# Patient Record
Sex: MALE | Race: WHITE | Employment: STUDENT | URBAN - NONMETROPOLITAN AREA
[De-identification: names, ages, dates, MRNs, and addresses within clinical notes are randomized per-mention and may not be internally consistent; named-entity substitution may affect disease eponyms.]

---

## 2017-05-24 ENCOUNTER — OFFICE VISIT (OUTPATIENT)
Dept: FAMILY MEDICINE CLINIC | Age: 22
End: 2017-05-24

## 2017-05-24 VITALS
BODY MASS INDEX: 29.61 KG/M2 | TEMPERATURE: 97.7 F | HEIGHT: 73 IN | SYSTOLIC BLOOD PRESSURE: 122 MMHG | RESPIRATION RATE: 12 BRPM | WEIGHT: 223.4 LBS | HEART RATE: 62 BPM | DIASTOLIC BLOOD PRESSURE: 62 MMHG

## 2017-05-24 DIAGNOSIS — F41.9 ANXIETY: Chronic | ICD-10-CM

## 2017-05-24 DIAGNOSIS — F90.2 ADHD (ATTENTION DEFICIT HYPERACTIVITY DISORDER), COMBINED TYPE: Primary | Chronic | ICD-10-CM

## 2017-05-24 DIAGNOSIS — F33.2 SEVERE EPISODE OF RECURRENT MAJOR DEPRESSIVE DISORDER, WITHOUT PSYCHOTIC FEATURES (HCC): Chronic | ICD-10-CM

## 2017-05-24 PROCEDURE — 99213 OFFICE O/P EST LOW 20 MIN: CPT | Performed by: FAMILY MEDICINE

## 2017-05-24 RX ORDER — ESCITALOPRAM OXALATE 10 MG/1
10 TABLET ORAL DAILY
Qty: 30 TABLET | Refills: 3 | Status: SHIPPED | OUTPATIENT
Start: 2017-05-24 | End: 2017-10-26 | Stop reason: SDUPTHER

## 2017-05-31 ENCOUNTER — OFFICE VISIT (OUTPATIENT)
Dept: PSYCHOLOGY | Age: 22
End: 2017-05-31

## 2017-05-31 DIAGNOSIS — F33.2 MAJOR DEPRESSIVE DISORDER, RECURRENT EPISODE, SEVERE WITH ANXIOUS DISTRESS (HCC): Primary | ICD-10-CM

## 2017-05-31 PROCEDURE — 90791 PSYCH DIAGNOSTIC EVALUATION: CPT | Performed by: PSYCHOLOGIST

## 2017-05-31 ASSESSMENT — PATIENT HEALTH QUESTIONNAIRE - PHQ9
1. LITTLE INTEREST OR PLEASURE IN DOING THINGS: 2
2. FEELING DOWN, DEPRESSED OR HOPELESS: 1
8. MOVING OR SPEAKING SO SLOWLY THAT OTHER PEOPLE COULD HAVE NOTICED. OR THE OPPOSITE, BEING SO FIGETY OR RESTLESS THAT YOU HAVE BEEN MOVING AROUND A LOT MORE THAN USUAL: 1
7. TROUBLE CONCENTRATING ON THINGS, SUCH AS READING THE NEWSPAPER OR WATCHING TELEVISION: 0
9. THOUGHTS THAT YOU WOULD BE BETTER OFF DEAD, OR OF HURTING YOURSELF: 3
5. POOR APPETITE OR OVEREATING: 1
SUM OF ALL RESPONSES TO PHQ9 QUESTIONS 1 & 2: 3
3. TROUBLE FALLING OR STAYING ASLEEP: 3
6. FEELING BAD ABOUT YOURSELF - OR THAT YOU ARE A FAILURE OR HAVE LET YOURSELF OR YOUR FAMILY DOWN: 3
SUM OF ALL RESPONSES TO PHQ QUESTIONS 1-9: 15
4. FEELING TIRED OR HAVING LITTLE ENERGY: 1
10. IF YOU CHECKED OFF ANY PROBLEMS, HOW DIFFICULT HAVE THESE PROBLEMS MADE IT FOR YOU TO DO YOUR WORK, TAKE CARE OF THINGS AT HOME, OR GET ALONG WITH OTHER PEOPLE: 2

## 2017-06-07 ENCOUNTER — OFFICE VISIT (OUTPATIENT)
Dept: FAMILY MEDICINE CLINIC | Age: 22
End: 2017-06-07

## 2017-06-07 VITALS
SYSTOLIC BLOOD PRESSURE: 132 MMHG | WEIGHT: 225 LBS | HEIGHT: 74 IN | BODY MASS INDEX: 28.88 KG/M2 | DIASTOLIC BLOOD PRESSURE: 74 MMHG | RESPIRATION RATE: 10 BRPM | HEART RATE: 96 BPM | TEMPERATURE: 97.9 F

## 2017-06-07 DIAGNOSIS — F33.2 SEVERE EPISODE OF RECURRENT MAJOR DEPRESSIVE DISORDER, WITHOUT PSYCHOTIC FEATURES (HCC): Primary | Chronic | ICD-10-CM

## 2017-06-07 DIAGNOSIS — F41.9 ANXIETY: Chronic | ICD-10-CM

## 2017-06-07 PROCEDURE — 99213 OFFICE O/P EST LOW 20 MIN: CPT | Performed by: FAMILY MEDICINE

## 2017-06-14 ENCOUNTER — OFFICE VISIT (OUTPATIENT)
Dept: PSYCHOLOGY | Age: 22
End: 2017-06-14

## 2017-06-14 DIAGNOSIS — F33.2 MAJOR DEPRESSIVE DISORDER, RECURRENT EPISODE, SEVERE WITH ANXIOUS DISTRESS (HCC): Primary | ICD-10-CM

## 2017-06-14 PROCEDURE — 90832 PSYTX W PT 30 MINUTES: CPT | Performed by: PSYCHOLOGIST

## 2017-06-27 ENCOUNTER — TELEPHONE (OUTPATIENT)
Dept: FAMILY MEDICINE CLINIC | Age: 22
End: 2017-06-27

## 2017-10-26 ENCOUNTER — OFFICE VISIT (OUTPATIENT)
Dept: FAMILY MEDICINE CLINIC | Age: 22
End: 2017-10-26
Payer: COMMERCIAL

## 2017-10-26 VITALS
TEMPERATURE: 98.1 F | RESPIRATION RATE: 16 BRPM | BODY MASS INDEX: 32.19 KG/M2 | HEIGHT: 74 IN | SYSTOLIC BLOOD PRESSURE: 134 MMHG | HEART RATE: 96 BPM | DIASTOLIC BLOOD PRESSURE: 86 MMHG | WEIGHT: 250.8 LBS

## 2017-10-26 DIAGNOSIS — F90.2 ADHD (ATTENTION DEFICIT HYPERACTIVITY DISORDER), COMBINED TYPE: Chronic | ICD-10-CM

## 2017-10-26 DIAGNOSIS — F33.41 RECURRENT MAJOR DEPRESSIVE DISORDER, IN PARTIAL REMISSION (HCC): ICD-10-CM

## 2017-10-26 DIAGNOSIS — F41.9 ANXIETY: Chronic | ICD-10-CM

## 2017-10-26 PROCEDURE — 99213 OFFICE O/P EST LOW 20 MIN: CPT | Performed by: FAMILY MEDICINE

## 2017-10-26 RX ORDER — ESCITALOPRAM OXALATE 10 MG/1
10 TABLET ORAL DAILY
Qty: 30 TABLET | Refills: 3 | Status: SHIPPED | OUTPATIENT
Start: 2017-10-26 | End: 2017-12-13 | Stop reason: SDUPTHER

## 2017-10-26 NOTE — PATIENT INSTRUCTIONS
Take part in a Jainism activity or other social gathering. Go to a [employer] game. · Ask a friend to have dinner with you. Take care of yourself  · Eat a balanced diet with plenty of fresh fruits and vegetables, whole grains, and lean protein. If you have lost your appetite, eat small snacks rather than large meals. · Avoid drinking alcohol or using illegal drugs. Do not take medicines that have not been prescribed for you. They may interfere with medicines you may be taking for depression, or they may make your depression worse. · Take your medicines exactly as they are prescribed. You may start to feel better within 1 to 3 weeks of taking antidepressant medicine. But it can take as many as 6 to 8 weeks to see more improvement. If you have questions or concerns about your medicines, or if you do not notice any improvement by 3 weeks, talk to your doctor. · If you have any side effects from your medicine, tell your doctor. Antidepressants can make you feel tired, dizzy, or nervous. Some people have dry mouth, constipation, headaches, sexual problems, or diarrhea. Many of these side effects are mild and will go away on their own after you have been taking the medicine for a few weeks. Some may last longer. Talk to your doctor if side effects are bothering you too much. You might be able to try a different medicine. · Get enough sleep. If you have problems sleeping:  ¨ Go to bed at the same time every night, and get up at the same time every morning. ¨ Keep your bedroom dark and quiet. ¨ Do not exercise after 5:00 p.m. ¨ Avoid drinks with caffeine after 5:00 p.m. · Avoid sleeping pills unless they are prescribed by the doctor treating your depression. Sleeping pills may make you groggy during the day, and they may interact with other medicine you are taking. · If you have any other illnesses, such as diabetes, heart disease, or high blood pressure, make sure to continue with your treatment.  Tell your doctor about all of the medicines you take, including those with or without a prescription. · Keep the numbers for these national suicide hotlines: 1-078-871-TALK (6-759.631.3515) and 1-239-VKVKVBQ (9-286.327.5209). If you or someone you know talks about suicide or feeling hopeless, get help right away. When should you call for help? Call 911 anytime you think you may need emergency care. For example, call if:  · You feel like hurting yourself or someone else. · Someone you know has depression and is about to attempt or is attempting suicide. Call your doctor now or seek immediate medical care if:  · You hear voices. · Someone you know has depression and:  ¨ Starts to give away his or her possessions. ¨ Uses illegal drugs or drinks alcohol heavily. ¨ Talks or writes about death, including writing suicide notes or talking about guns, knives, or pills. ¨ Starts to spend a lot of time alone. ¨ Acts very aggressively or suddenly appears calm. Watch closely for changes in your health, and be sure to contact your doctor if:  · You do not get better as expected. Where can you learn more? Go to https://Ventrus Biosciences[employer]acao.L[employer]. org and sign in to your [employer] account. Enter W184 in the 5by box to learn more about \"Recovering From Depression: Care Instructions. \"     If you do not have an account, please click on the \"Sign Up Now\" link. Current as of: July 26, 2016  Content Version: 11.3  © 7330-3832 [employer]. Care instructions adapted under license by [facility] ([facility]). If you have questions about a medical condition or this instruction, always ask your healthcare professional. [place] [address] any warranty or liability for your use of this information. Patient Education        Anxiety Disorder: Care Instructions  Your Care Instructions  Anxiety is a normal reaction to stress.  Difficult situations can cause you to have symptoms such as sweaty palms and a nervous also may want to do other activities, such as running, swimming, cycling, or playing tennis or team sports. Relaxation techniques  Do relaxation exercises 10 to 20 minutes a day. You can play soothing, relaxing music while you do them, if you wish. · Tell others in your house that you are going to do your relaxation exercises. Ask them not to disturb you. · Find a comfortable place, away from all distractions and noise. · Lie down on your back, or sit with your back straight. · Focus on your breathing. Make it slow and steady. · Breathe in through your nose. Breathe out through either your nose or mouth. · Breathe deeply, filling up the area between your navel and your rib cage. Breathe so that your belly goes up and down. · Do not hold your breath. · Breathe like this for 5 to 10 minutes. Notice the feeling of calmness throughout your whole body. As you continue to breathe slowly and deeply, relax by doing the following for another 5 to 10 minutes:  · Tighten and relax each muscle group in your body. You can begin at your toes and work your way up to your head. · Imagine your muscle groups relaxing and becoming heavy. · Empty your mind of all thoughts. · Let yourself relax more and more deeply. · Become aware of the state of calmness that surrounds you. · When your relaxation time is over, you can bring yourself back to alertness by moving your fingers and toes and then your hands and feet and then stretching and moving your entire body. Sometimes people fall asleep during relaxation, but they usually wake up shortly afterward. · Always give yourself time to return to full alertness before you drive a car or do anything that might cause an accident if you are not fully alert. Never play a relaxation tape while you drive a car. When should you call for help? Call 911 anytime you think you may need emergency care.  For example, call if:  · You feel you cannot stop from hurting yourself or someone else.  Keep the numbers for these national suicide hotlines: 6-250-254-TALK (5-996.287.7133) and 7-800-VOUHEAG (2-114.587.6061). If you or someone you know talks about suicide or feeling hopeless, get help right away. Watch closely for changes in your health, and be sure to contact your doctor if:  · You have anxiety or fear that affects your life. · You have symptoms of anxiety that are new or different from those you had before. Where can you learn more? Go to https://FriendFinder [employer].discoapi. org and sign in to your [employer] account. Enter P754 in the Giggle box to learn more about \"Anxiety Disorder: Care Instructions. \"     If you do not have an account, please click on the \"Sign Up Now\" link. Current as of: July 26, 2016  Content Version: 11.3  © 6066-5455 [employer]. Care instructions adapted under license by [facility] ([facility]). If you have questions about a medical condition or this instruction, always ask your healthcare professional. [place] [address] any warranty or liability for your use of this information.

## 2017-10-26 NOTE — PROGRESS NOTES
Chief Complaint   Patient presents with    Follow-up     depression/anxiety/AHDH       History obtained from the patient. SUBJECTIVE:  Song Grayson is a 23 y.o. male that presents today for    1.) Depressed Mood:    HPI PRIOR VISIT: pt asked to see a therapist. After discussion, pt admits to long hx of on and off again depression. Had pretty severe depression in [place] [employer], was on prozac. Only took for a little while, didn't tolerate. Did do a lot of counseling and that helped quite a bit. Asking to see therapist. Having a lot anxiety as well. Hx of suicide attempt in the past. Currently has on and off again vague thoughts of suicide however currently denies SI/HI. Willing to start meds in addition to counseling    Depressed Mood? yes -   Anhedonia? yes -   Appetite changes? yes -   Sleep disturbances? yes -   Feelings of guilt? yes -   Decreased energy? yes -   Impaired concentration? yes -   Substance abuse? no    Suicidal/Homicidal Ideation? no        UPDATE LAST VISIT: started on lexapro last visit and has seen psychology. Modest improvement in 2 wks. Still depressed and anxious, but feeling better. No side effects from lexapro. Denies SI/HI currently. UPDATE TODAY: pt went home for the summer d/t worsening depression. Did not f/u with anyone, but did take his lexapro. Ran out of lexapro about 4 to 6 wks ago and has noted that his depression is starting to get worse and is getting more anxious. Denies SI/HI. Feeling much better than before, but does realize his sxs are coming back. School going well. Grades excellent so far. Has f/u with Dr. Otelia Boast tomorrow. 2.) ADHD    Current ADD regimen:  Vyvanse, typicaly works well. Out of meds for a month or so, so getting harder to focus  School is going well. Attendance is good. Behavior Problems:?: no  Sleep disturbances or appetite changes?: no and no  Evidence of abuse or diversion?: no    Nausea? no  Chest Pain? no  Headaches? no        Current Outpatient Prescriptions   Medication Sig Dispense Refill    escitalopram (LEXAPRO) 10 MG tablet Take 1 tablet by mouth daily 30 tablet 3    Lisdexamfetamine Dimesylate (VYVANSE) 60 MG CAPS Take 1 capsule by mouth daily . 30 capsule 0     No current facility-administered medications for this visit. Orders Placed This Encounter   Medications    escitalopram (LEXAPRO) 10 MG tablet     Sig: Take 1 tablet by mouth daily     Dispense:  30 tablet     Refill:  3    Lisdexamfetamine Dimesylate (VYVANSE) 60 MG CAPS     Sig: Take 1 capsule by mouth daily . Dispense:  30 capsule     Refill:  0         All medications reviewed and reconciled, including OTC and herbal medications. Updated list given to patient. Patient Active Problem List   Diagnosis    ADHD (attention deficit hyperactivity disorder), combined type    Major depression    Anxiety         Past Medical History:   Diagnosis Date    ADHD (attention deficit hyperactivity disorder), combined type     Anxiety     Major depression          History reviewed. No pertinent surgical history. No Known Allergies      Social History     Social History    Marital status: Single     Spouse name: N/A    Number of children: N/A    Years of education: N/A     Occupational History    Not on file. Social History Main Topics    Smoking status: Never Smoker    Smokeless tobacco: Never Used    Alcohol use No    Drug use: No    Sexual activity: Not on file     Other Topics Concern    Not on file     Social History Narrative    No narrative on file       Family History   Problem Relation Age of Onset    Asthma Father          I have reviewed the patient's past medical history, past surgical history, allergies, medications, social and family history and I have made updates where appropriate.       Review of Systems  Positive responses are highlighted in bold    Constitutional:  Fever, Chills, Night Sweats, Fatigue, Unexpected of the lower extremities. Skin: warm and dry, no rash or erythema  Psych: Affect constricted. Mood depressed. Thought process is normal without evidence of psychosis. Good insight and appropriate interaction. Cognition and memory appear to be intact. ASSESSMENT & PLAN  1. Recurrent major depressive disorder, in partial remission ([address]    Better than before, but starting to worsen again off his lexapro  Will resume this at 10mg daily  F/u with Dr. Luis Escobar for counseling  F/u here 8 wks    - escitalopram (LEXAPRO) 10 MG tablet; Take 1 tablet by mouth daily  Dispense: 30 tablet; Refill: 3    2. Anxiety    As above    - escitalopram (LEXAPRO) 10 MG tablet; Take 1 tablet by mouth daily  Dispense: 30 tablet; Refill: 3    3. ADHD (attention deficit hyperactivity disorder), combined type    Resume vyvance  May call for refills    OAARS reviewed and appropriate. Controlled Substances Monitoring:     Attestation: The Prescription Monitoring Report for this patient was reviewed today. HUI Hodges  Documentation: Possible medication side effects, risk of tolerance and/or dependence, and alternative treatments discussed., No signs of potential drug abuse or diversion identified. Radha Barnard DO)    - Lisdexamfetamine Dimesylate (VYVANSE) 60 MG CAPS; Take 1 capsule by mouth daily . Dispense: 30 capsule; Refill: 0      DISPOSITION    Return in about 8 weeks (around 12/21/2017) for follow-up depression and ADHD, sooner as needed. Aguero Dose released without restrictions. Future Appointments  Date Time Provider Sammie Fernandez   10/27/2017 8:00 AM Radha Barnard PSY.D [facility] [place]   12/21/2017 1:00 PM Radha Barnard DO [facility] [address]    Patient given educational materials on: See Attached    Barriers to learning and self management: none    Discussed use, benefit, and side effects of prescribed medications.   Barriers to medication compliance addressed. All patient questions answered. Pt voiced understanding.        Electronically signed by Lori Pedro DO on 10/26/2017 at 3:41 PM

## 2017-10-26 NOTE — PROGRESS NOTES
Visit Information    Have you changed or started any medications since your last visit including any over-the-counter medicines, vitamins, or herbal medicines? no   Are you having any side effects from any of your medications? -  no  Have you stopped taking any of your medications? Is so, why? -  no    Have you seen any other physician or provider since your last visit? No  Have you had any other diagnostic tests since your last visit? No  Have you been seen in the emergency room and/or had an admission to a hospital since we last saw you? No  Have you had your routine dental cleaning in the past 6 months? yes - 6/2017    Have you activated your [employer] account? If not, what are your barriers?  Yes     Patient Care Team:  Sergey Wood DO as PCP - General (Family Medicine)    Medical History Review  Past Medical, Family, and Social History reviewed and does contribute to the patient presenting condition    Health Maintenance   Topic Date Due    HIV screen  04/21/2012    Meningococcal (MCV) Vaccine Age 0-22 Years (1 of 1) 04/21/2013    Flu vaccine (1) 09/01/2017    DTaP/Tdap/Td vaccine (2 - Td) 01/01/2021

## 2017-10-27 ENCOUNTER — OFFICE VISIT (OUTPATIENT)
Dept: PSYCHOLOGY | Age: 22
End: 2017-10-27
Payer: COMMERCIAL

## 2017-10-27 DIAGNOSIS — F33.0 MAJOR DEPRESSIVE DISORDER, RECURRENT EPISODE, MILD (HCC): Primary | ICD-10-CM

## 2017-10-27 PROCEDURE — 90834 PSYTX W PT 45 MINUTES: CPT | Performed by: PSYCHOLOGIST

## 2017-10-27 ASSESSMENT — PATIENT HEALTH QUESTIONNAIRE - PHQ9
10. IF YOU CHECKED OFF ANY PROBLEMS, HOW DIFFICULT HAVE THESE PROBLEMS MADE IT FOR YOU TO DO YOUR WORK, TAKE CARE OF THINGS AT HOME, OR GET ALONG WITH OTHER PEOPLE: 1
3. TROUBLE FALLING OR STAYING ASLEEP: 1
2. FEELING DOWN, DEPRESSED OR HOPELESS: 1
8. MOVING OR SPEAKING SO SLOWLY THAT OTHER PEOPLE COULD HAVE NOTICED. OR THE OPPOSITE, BEING SO FIGETY OR RESTLESS THAT YOU HAVE BEEN MOVING AROUND A LOT MORE THAN USUAL: 0
5. POOR APPETITE OR OVEREATING: 1
9. THOUGHTS THAT YOU WOULD BE BETTER OFF DEAD, OR OF HURTING YOURSELF: 1
6. FEELING BAD ABOUT YOURSELF - OR THAT YOU ARE A FAILURE OR HAVE LET YOURSELF OR YOUR FAMILY DOWN: 2
1. LITTLE INTEREST OR PLEASURE IN DOING THINGS: 1
4. FEELING TIRED OR HAVING LITTLE ENERGY: 0
7. TROUBLE CONCENTRATING ON THINGS, SUCH AS READING THE NEWSPAPER OR WATCHING TELEVISION: 0
SUM OF ALL RESPONSES TO PHQ9 QUESTIONS 1 & 2: 2
SUM OF ALL RESPONSES TO PHQ QUESTIONS 1-9: 7

## 2017-10-27 NOTE — PATIENT INSTRUCTIONS
1.  Information about how deep breathing can help with performance is attached for you. 2.  Additional information is also attached about healthy behaviors to help with depression. 3.  Continue taking the Lexapro as prescribed. 4.  Complete the gratitude list each day for the next couple of weeks. 5.  Remember sometimes you need to reset your expectations if they are too high. 6.  Remember the reason you are holding on so tightly to something can actually help you to let go. 7.  Return to see Dr. Mendy Villanueva in 2-3 weeks. Gratitude    People who are depressed often develop tunnel vision and notice negative aspects of their life more easily. They often overlook more positive experiences. This tends to worsen depression. People who write down positive aspects of each day for which they are grateful often experience improvements in mood. They also find that they begin to notice the good things in their life more often. Each day write down 1 to 3 positive things from that day for which you are grateful. These could be good things that happened or that you experienced that day. Example: Today I am grateful for_______________________________________        Date Today I am grateful for. .     1.    2.    3.     Date Today I am grateful for. .     1.    2.    3.     Date Today I am grateful for. .     1.    2.    3.     Date Today I am grateful for. .     1.    2.    3.     Date Today I am grateful for. .     1.    2.    3.     Date Today I am grateful for. .     1.    2.    3.     Date Today I am grateful for. .     1.    2.    3.       What is deep breathing? Deep breathing involves using your diaphragm muscle to help bring about a state of physiological relaxation. The diaphragm is a large muscle that rests across the bottom of your rib cage. When you inhale, the diaphragm muscle drops, opening up space so air can come in.   When watching someone do this it looks like your and boost feelings of jahaira and well-being. Try yoga, deep breathing, progressive muscle relaxation, or meditation. Care for a pet. While nothing can replace the human connection, pets can bring jahaira and companionship into your life and help you feel less isolated. Caring for a pet can also get you outside of yourself and give you a sense of being neededboth powerful antidotes to depression. Do things you enjoy (or used to)  While you cant force yourself to have fun or experience pleasure, you can choose to do things that you used to enjoy.  a former hobby or a sport you used to like. Express yourself creatively through music, art, or writing. Go out with friends. Take a day trip to a museum, the [address] , or the [employer]. Push yourself to do things, even when you dont feel like it. You might be surprised at how much better you feel once youre out in the world. Even if your depression doesnt lift immediately, youll gradually feel more upbeat and energetic as you make time for fun activities. Depression self-help tip 5: Eat a healthy, mood-boosting diet  What you eat has a direct impact on the way you feel. Aim for a balanced diet of low-fat protein, complex carbohydrates, fruits and vegetables. Reduce your intake of foods that can adversely affect your brain and mood, such as caffeine, alcohol, trans fats, saturated fats, and foods with high levels of chemical preservatives or hormones (such as certain meats). Dont skip meals. Going too long between meals can make you feel irritable and tired, so aim to eat something at least every three to four hours. Minimize sugar and refined carbs. You may crave sugary snacks, baked goods, or comfort foods such as pasta or Western Keila fries, but these feel-good foods quickly lead to a crash in mood and energy. Focus on complex carbohydrates.  Foods such as baked potatoes, whole-wheat pasta, oatmeal, and whole grain breads can boost serotonin levels without

## 2017-10-27 NOTE — PROGRESS NOTES
Behavioral Health Consultation  Nile Schaumann, PsyD  Psychologist  10/27/2017  8:06 AM      Time spent with Patient: 45 minutes  This is patient's third  [facility] appointment. Reason for Consult:  depression  Referring Provider: Heather Pickett [address] [place]    Feedback given to PCP. 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 He said he's had some SI, \"thoughts about being better off dead\" but never any plans or intent. He said he tends to put a lot of pressure on himself to be perfect, or at times his expectations of himself are unrealistic. Pt also said he has a tendency to not be able to let things go at work. We discussed some strategies that are helpful to manage anxiety and let go of unhealthy emotions. O:  MSE:    Appearance    alert, cooperative, no distress  Appetite normal  Sleep disturbance Some  Fatigue No  Loss of pleasure No  Impulsive behavior No  Speech    spontaneous, normal rate, normal volume and well articulated  Mood    Anxious  Depressed  Low self-esteem  Affect    depressed affect  Thought Content    intact, hopelessness, helplessness, cognitive distortions and all or nothing thinking  Thought Process    linear, goal directed and coherent  Associations    logical connections  Insight    Fair  Judgment    Intact  Orientation    oriented to person, place, time, and general circumstances  Memory    recent and remote memory intact  Attention/Concentration    intact  Morbid ideation No  Suicide Assessment   No SI, plans or intent today    History:    Medications:   Current Outpatient Prescriptions   Medication Sig Dispense Refill    escitalopram (LEXAPRO) 10 MG tablet Take 1 tablet by mouth daily 30 tablet 3    Lisdexamfetamine Dimesylate (VYVANSE) 60 MG CAPS Take 1 capsule by mouth daily . 30 capsule 0     No current facility-administered medications for this visit.         Social History:   Social History Social History    Marital status: Single     Spouse name: N/A    Number of children: N/A    Years of education: N/A     Occupational History    Not on file. Social History Main Topics    Smoking status: Never Smoker    Smokeless tobacco: Never Used    Alcohol use No    Drug use: No    Sexual activity: Not on file     Other Topics Concern    Not on file     Social History Narrative    No narrative on file       TOBACCO:   reports that he has never smoked. He has never used smokeless tobacco.  ETOH:   reports that he does not drink alcohol. Family History:   Family History   Problem Relation Age of Onset    Asthma Father            A:  Pt's score on PHQ-9 today was in the mild range of depression. It seems due to the increasing demands of school, some anxiety has manifested. Pt willing to continue to meet with [facility] to address dep/anx. PHQ Scores 10/27/2017 5/31/2017 12/13/2016 9/8/2016   PHQ2 Score 2 3 0 2   PHQ9 Score 7 15 0 10     Interpretation of Total Score Depression Severity: 1-4 = Minimal depression, 5-9 = Mild depression, 10-14 = Moderate depression, 15-19 = Moderately severe depression, 20-27 = Severe depression      Diagnosis:    Major depressive disorder; mild, with anxious distress   ADHD      Diagnosis Date    ADHD (attention deficit hyperactivity disorder), combined type     Anxiety     Major depression      Problems related to the social environment and Other psychosocial and environmental problems      Plan:  Pt interventions:  Discussed self-care (sleep, nutrition, rewarding activities, social support, exercise), Discussed and problem-solved barriers in adhering to behavioral change plan, Motivational Interviewing to increase patient confidence and compliance with adhering to behavioral change plan, Motivational Interviewing to determine importance and readiness for change, Supportive techniques      Pt Behavioral Change Plan:  1.   Information about how deep breathing

## 2017-11-15 ENCOUNTER — OFFICE VISIT (OUTPATIENT)
Dept: PSYCHOLOGY | Age: 22
End: 2017-11-15
Payer: COMMERCIAL

## 2017-11-15 DIAGNOSIS — F33.0 MAJOR DEPRESSIVE DISORDER, RECURRENT EPISODE, MILD (HCC): Primary | ICD-10-CM

## 2017-11-15 PROCEDURE — 90832 PSYTX W PT 30 MINUTES: CPT | Performed by: PSYCHOLOGIST

## 2017-11-15 NOTE — PATIENT INSTRUCTIONS
1.  Look over the info below which might help for studying. 2.  Try to record 3 things you are thankful for each day. 3.  Return to see Dr. Yuvonne Duane in 1 month. Call Dr. Anish Silva office and get an earlier appt (around Dec 15). Managing Attention Deficits  Increase Attention by:   Decreasing Stress  Increasing Interest in a Task  Prioritizing  Saying No to Unreasonable Demands  Increase Focus by:   Setting Goals  Creating a Pioneer Statement  Creating a Not-To-Do List  Managing Impulsivity and Hyperactivity  Decrease Impulsivity by:   Pausing  Increasing Detachment  Asking \"What Did You Mean By That? \"  Managing Anger  Listing Consequences  Translating Complaints into Requests  Practicing Communication Skills  Increasing Positive Self-talk  Decrease Hyperactivity by: Increasing Self-Care  Increasing Exercise and Relaxation  Increase Persistence by:   Managing Emotions  Increasing Awareness of Benefits  Decreasing Self-Criticism  Increasing Rewards  Increase Task Completion by:   Predicting Obstacles  Identifying Unmet Need  Asking for Support  Create Real World Success  Increase Motivation by:   Decreasing Depletion  Noticing Progress  Focusing on Positive Feelings  Asking \"Why do I want to change? \"  Increase Confidence by:    Identifying Strengths  Listing Past Successes  Increase Organization by:   Reframing  Decreasing Emotional Upsets  with Technology  Increase Time Management by:   Taking 10 minutes Each Morning  Building New Habits  Using Prompts and Reminders

## 2017-12-13 ENCOUNTER — OFFICE VISIT (OUTPATIENT)
Dept: PSYCHOLOGY | Age: 22
End: 2017-12-13
Payer: COMMERCIAL

## 2017-12-13 DIAGNOSIS — F33.0 MAJOR DEPRESSIVE DISORDER, RECURRENT EPISODE, MILD (HCC): Primary | ICD-10-CM

## 2017-12-13 DIAGNOSIS — F90.9 ADULT ADHD: ICD-10-CM

## 2017-12-13 DIAGNOSIS — F41.9 ANXIETY: Chronic | ICD-10-CM

## 2017-12-13 DIAGNOSIS — F90.2 ADHD (ATTENTION DEFICIT HYPERACTIVITY DISORDER), COMBINED TYPE: Chronic | ICD-10-CM

## 2017-12-13 DIAGNOSIS — F33.41 RECURRENT MAJOR DEPRESSIVE DISORDER, IN PARTIAL REMISSION (HCC): ICD-10-CM

## 2017-12-13 PROCEDURE — 90832 PSYTX W PT 30 MINUTES: CPT | Performed by: PSYCHOLOGIST

## 2017-12-13 RX ORDER — ESCITALOPRAM OXALATE 10 MG/1
10 TABLET ORAL DAILY
Qty: 30 TABLET | Refills: 3 | Status: SHIPPED | OUTPATIENT
Start: 2017-12-13 | End: 2018-04-18 | Stop reason: SDUPTHER

## 2017-12-13 ASSESSMENT — PATIENT HEALTH QUESTIONNAIRE - PHQ9
SUM OF ALL RESPONSES TO PHQ9 QUESTIONS 1 & 2: 1
SUM OF ALL RESPONSES TO PHQ QUESTIONS 1-9: 8
9. THOUGHTS THAT YOU WOULD BE BETTER OFF DEAD, OR OF HURTING YOURSELF: 2
6. FEELING BAD ABOUT YOURSELF - OR THAT YOU ARE A FAILURE OR HAVE LET YOURSELF OR YOUR FAMILY DOWN: 1
1. LITTLE INTEREST OR PLEASURE IN DOING THINGS: 0
10. IF YOU CHECKED OFF ANY PROBLEMS, HOW DIFFICULT HAVE THESE PROBLEMS MADE IT FOR YOU TO DO YOUR WORK, TAKE CARE OF THINGS AT HOME, OR GET ALONG WITH OTHER PEOPLE: 1
2. FEELING DOWN, DEPRESSED OR HOPELESS: 1
4. FEELING TIRED OR HAVING LITTLE ENERGY: 1
5. POOR APPETITE OR OVEREATING: 1
8. MOVING OR SPEAKING SO SLOWLY THAT OTHER PEOPLE COULD HAVE NOTICED. OR THE OPPOSITE, BEING SO FIGETY OR RESTLESS THAT YOU HAVE BEEN MOVING AROUND A LOT MORE THAN USUAL: 0
7. TROUBLE CONCENTRATING ON THINGS, SUCH AS READING THE NEWSPAPER OR WATCHING TELEVISION: 1
3. TROUBLE FALLING OR STAYING ASLEEP: 1

## 2017-12-13 NOTE — PATIENT INSTRUCTIONS
1.  Contact Dr. Scott Earing office today about a refill on the Lexapro and Vyvanse. 2.  Try this method of journaling:     -3 things that went well today   -2 things you are looking forward to    3. Take breaks for yourself, and do things YOU enjoy    4. Return to see Dr. Rodrigo العراقي in 1 month.

## 2017-12-13 NOTE — PROGRESS NOTES
Behavioral Health Consultation  Raymundo Ibarra PsyD  Psychologist  12/13/2017  2:04 PM      Time spent with Patient:  30 minutes  This is patient's fifth  [facility] appointment. Reason for Consult:  depression and stress  Referring Provider: Casandra Langton [address] [place], [address]    Feedback given to PCP. S:  Pt has been keeping track of the gratitude list, mostly thankful for family and friends. Pt said he recently ran out of the Lexapro and Vyvanse. He was advised to contact PCP about refills today. He reported that he has an A in his class, and feels confident about being able to maintain this grade next week when he has his final.  Still has thoughts of not wanting to be here, but no plans or intent of suicide. We processed his thoughts and feelings about his situation. O:  MSE:    Appearance    alert, cooperative, mild distress  Appetite normal  Sleep disturbance Some  Fatigue No  Loss of pleasure No  Impulsive behavior No  Speech    spontaneous, normal rate, normal volume and well articulated  Mood    Anxious  Depressed  Low self-esteem  Affect    depressed affect  Thought Content    intact, hopelessness, helplessness, cognitive distortions and all or nothing thinking  Thought Process    linear, goal directed and coherent  Associations    logical connections  Insight    Fair  Judgment    Intact  Orientation    oriented to person, place, time, and general circumstances  Memory    recent and remote memory intact  Attention/Concentration    intact  Morbid ideation No  Suicide Assessment   No SI, plans or intent today    History:    Medications:   Current Outpatient Prescriptions   Medication Sig Dispense Refill    escitalopram (LEXAPRO) 10 MG tablet Take 1 tablet by mouth daily 30 tablet 3    Lisdexamfetamine Dimesylate (VYVANSE) 60 MG CAPS Take 1 capsule by mouth daily . 30 capsule 0     No current facility-administered medications for this visit.         Social History:   Social History     Social History    Marital status: Single     Spouse name: N/A    Number of children: N/A    Years of education: N/A     Occupational History    Not on file. Social History Main Topics    Smoking status: Never Smoker    Smokeless tobacco: Never Used    Alcohol use No    Drug use: No    Sexual activity: Not on file     Other Topics Concern    Not on file     Social History Narrative    No narrative on file       TOBACCO:   reports that he has never smoked. He has never used smokeless tobacco.  ETOH:   reports that he does not drink alcohol. Family History:   Family History   Problem Relation Age of Onset    Asthma Father            A:  Pt needs back on his medications to manage his depression and ADHD. He was advised to contact PCP office today about refills. Pt willing to continue to meet with [facility] to address dep/anx.      Diagnosis:    Major depressive disorder; mild, with anxious distress   ADHD      Diagnosis Date    ADHD (attention deficit hyperactivity disorder), combined type     Anxiety     Major depression      Problems related to the social environment and Other psychosocial and environmental problems      Plan:  Pt interventions:  Discussed self-care (sleep, nutrition, rewarding activities, social support, exercise), Discussed and problem-solved barriers in adhering to behavioral change plan, Motivational Interviewing to increase patient confidence and compliance with adhering to behavioral change plan, Motivational Interviewing to determine importance and readiness for change, Supportive techniques      Pt Behavioral Change Plan:  1. Contact Dr. Daniela Beltre office today about a refill on the Lexapro and Vyvanse. 2.  Try this method of journaling:     -3 things that went well today   -2 things you are looking forward to    3. Take breaks for yourself, and do things YOU enjoy    4. Return to see Dr. Trevon Bacon in 1 month.   Continue to monitor SI.

## 2017-12-13 NOTE — TELEPHONE ENCOUNTER
Bhargavi Rasmussen called requesting a refill on the following medications:  Requested Prescriptions     Pending Prescriptions Disp Refills    Lisdexamfetamine Dimesylate (VYVANSE) 60 MG CAPS 30 capsule 0     Sig: Take 1 capsule by mouth daily .      Pharmacy verified:  .[email or web address]      Date of last visit: 10/26/17  Date of next visit (if applicable): 96/26/8620

## 2018-01-09 ENCOUNTER — OFFICE VISIT (OUTPATIENT)
Dept: FAMILY MEDICINE CLINIC | Age: 23
End: 2018-01-09
Payer: COMMERCIAL

## 2018-01-09 VITALS
HEART RATE: 108 BPM | WEIGHT: 238 LBS | TEMPERATURE: 97.5 F | BODY MASS INDEX: 30.54 KG/M2 | HEIGHT: 74 IN | RESPIRATION RATE: 12 BRPM | DIASTOLIC BLOOD PRESSURE: 76 MMHG | SYSTOLIC BLOOD PRESSURE: 128 MMHG

## 2018-01-09 DIAGNOSIS — F90.2 ADHD (ATTENTION DEFICIT HYPERACTIVITY DISORDER), COMBINED TYPE: Primary | Chronic | ICD-10-CM

## 2018-01-09 DIAGNOSIS — F41.9 ANXIETY: Chronic | ICD-10-CM

## 2018-01-09 DIAGNOSIS — F33.42 RECURRENT MAJOR DEPRESSIVE DISORDER, IN FULL REMISSION (HCC): ICD-10-CM

## 2018-01-09 PROCEDURE — 99213 OFFICE O/P EST LOW 20 MIN: CPT | Performed by: FAMILY MEDICINE

## 2018-01-09 NOTE — PROGRESS NOTES
Sore throat  Cardiovascular:  Chest Pain, Palpitations, Orthopnea, Paroxysmal Nocturnal Dyspnea  Respiratory:  Cough, Wheezing, Shortness of breath, Chest tightness, Apnea  Gastrointestinal:  Nausea, Vomiting, Diarrhea, Constipation, Heartburn, Blood in stool  Genitourinary:  Difficulty or painful urination, Flank pain, Change in frequency, Urgency  Skin:  Color change, Rash, Itching, Wound  Musculoskeletal:  Joint pain, Back pain, Gait problems, Joint swelling, Myalgias  Neurological:  Dizziness, Headaches, Presyncope, Numbness, Seizures, Tremors  Endocrine:  Heat Intolerance, Cold Intolerance, Polydipsia, Polyphagia, Polyuria      PHYSICAL EXAM:  Vitals:    01/09/18 1418   BP: 128/76   Pulse: 108   Resp: 12   Temp: 97.5 °F (36.4 °C)   TempSrc: Oral   Weight: 238 lb (108 kg)   Height: 6' 1.75\" (1.873 m)     Body mass index is 30.76 kg/m². Pain Score:   0 - No pain    VS Reviewed  General Appearance: A&O x 3, No acute distress,well developed and well- nourished  Eyes: pupils equal, round, and reactive to light, extraocular eye movements intact, conjunctivae and eye lids without erythema  ENT: external ear and ear canal clear bilaterally, TMs intact and regular, nose without deformity, nasal mucosa and turbinates normal without polyps, oropharynx normal, dentition is normal for age  Neck: supple and non-tender without mass, no thyromegaly or thyroid nodules, no cervical lymphadenopathy  Pulmonary/Chest: clear to auscultation bilaterally- no wheezes, rales or rhonchi, normal air movement, no respiratory distress or retractions  Cardiovascular: S1 and S2 auscultated w/ RRR. No murmurs, rubs, clicks, or gallops, distal pulses intact. Abdomen: soft, non-tender, non-distended, bowl sounds physiologic,  no rebound or guarding, no masses or hernias noted. Liver and spleen without enlargement. Extremities: no cyanosis, clubbing or edema of the lower extremities.    Skin: warm and dry, no rash or erythema  Psych: Affect

## 2018-01-09 NOTE — PATIENT INSTRUCTIONS
active  · Stay busy and get outside. Take a walk, or try some other light exercise. · Talk with your doctor about an exercise program. Exercise can help with mild depression. · Go to a movie or concert. Take part in a Caodaism activity or other social gathering. Go to a ball game. · Ask a friend to have dinner with you. Take care of yourself  · Eat a balanced diet with plenty of fresh fruits and vegetables, whole grains, and lean protein. If you have lost your appetite, eat small snacks rather than large meals. · Avoid drinking alcohol or using illegal drugs. Do not take medicines that have not been prescribed for you. They may interfere with medicines you may be taking for depression, or they may make your depression worse. · Take your medicines exactly as they are prescribed. You may start to feel better within 1 to 3 weeks of taking antidepressant medicine. But it can take as many as 6 to 8 weeks to see more improvement. If you have questions or concerns about your medicines, or if you do not notice any improvement by 3 weeks, talk to your doctor. · If you have any side effects from your medicine, tell your doctor. Antidepressants can make you feel tired, dizzy, or nervous. Some people have dry mouth, constipation, headaches, sexual problems, or diarrhea. Many of these side effects are mild and will go away on their own after you have been taking the medicine for a few weeks. Some may last longer. Talk to your doctor if side effects are bothering you too much. You might be able to try a different medicine. · Get enough sleep. If you have problems sleeping:  ¨ Go to bed at the same time every night, and get up at the same time every morning. ¨ Keep your bedroom dark and quiet. ¨ Do not exercise after 5:00 p.m. ¨ Avoid drinks with caffeine after 5:00 p.m. · Avoid sleeping pills unless they are prescribed by the doctor treating your depression.  Sleeping pills may make you groggy during the day, and they information.

## 2018-01-17 ENCOUNTER — OFFICE VISIT (OUTPATIENT)
Dept: PSYCHOLOGY | Age: 23
End: 2018-01-17
Payer: COMMERCIAL

## 2018-01-17 DIAGNOSIS — F33.0 MAJOR DEPRESSIVE DISORDER, RECURRENT EPISODE, MILD (HCC): Primary | ICD-10-CM

## 2018-01-17 PROCEDURE — 90832 PSYTX W PT 30 MINUTES: CPT | Performed by: PSYCHOLOGIST

## 2018-01-17 ASSESSMENT — PATIENT HEALTH QUESTIONNAIRE - PHQ9
3. TROUBLE FALLING OR STAYING ASLEEP: 1
7. TROUBLE CONCENTRATING ON THINGS, SUCH AS READING THE NEWSPAPER OR WATCHING TELEVISION: 2
9. THOUGHTS THAT YOU WOULD BE BETTER OFF DEAD, OR OF HURTING YOURSELF: 0
SUM OF ALL RESPONSES TO PHQ QUESTIONS 1-9: 5
1. LITTLE INTEREST OR PLEASURE IN DOING THINGS: 1
8. MOVING OR SPEAKING SO SLOWLY THAT OTHER PEOPLE COULD HAVE NOTICED. OR THE OPPOSITE, BEING SO FIGETY OR RESTLESS THAT YOU HAVE BEEN MOVING AROUND A LOT MORE THAN USUAL: 0
SUM OF ALL RESPONSES TO PHQ9 QUESTIONS 1 & 2: 1
2. FEELING DOWN, DEPRESSED OR HOPELESS: 0
10. IF YOU CHECKED OFF ANY PROBLEMS, HOW DIFFICULT HAVE THESE PROBLEMS MADE IT FOR YOU TO DO YOUR WORK, TAKE CARE OF THINGS AT HOME, OR GET ALONG WITH OTHER PEOPLE: 0
4. FEELING TIRED OR HAVING LITTLE ENERGY: 0
5. POOR APPETITE OR OVEREATING: 1
6. FEELING BAD ABOUT YOURSELF - OR THAT YOU ARE A FAILURE OR HAVE LET YOURSELF OR YOUR FAMILY DOWN: 0

## 2018-01-17 NOTE — PATIENT INSTRUCTIONS
things. Find a good listener. Just as a pressure relief valve allows steam to flow out of a pressure cooker and keeps it from blowing up, so talking allows stress to flow out of the body and keeps us from blowing up. 9. Accept What You Cannot Change:  If the problem is beyond your control at this time, try your best to accept it until you can change it. It beats spinning your wheels and getting nowhere. 10. Evaluate Your Perceptions:  What we think is sometimes what we feel. If we constantly think unrealistic or alarming thoughts about ourselves or other folks, then our stress level is increased. 11. Relax Unrealistic Standards:  When we set unrealistic standards for ourselves, we usually can never reach them. If we do, we burn out quickly. Set reasonable goals and standards. 12. Reward Yourself:  Find ways to reward yourself when youve completed a minor or major task. We cannot always depend on others to recognize us, so we must develop our own reward system. 13. Become Assertive: Take steps to solve problems instead of feeling helpless. Distinguishing assertiveness (respecting others rights and your rights) from aggressiveness and passivity can do much to resolve internal stress. 14. Rediscover Humor:  Learn to laugh at yourself and your situation! 15. Increase Pleasurable Activities:  Take time to participate in fun, pleasurable, activities on a regular basis.

## 2018-02-11 ENCOUNTER — HOSPITAL ENCOUNTER (EMERGENCY)
Age: 23
Discharge: HOME | End: 2018-02-11
Payer: COMMERCIAL

## 2018-02-11 VITALS
WEIGHT: 232.2 LBS | BODY MASS INDEX: 29.8 KG/M2 | TEMPERATURE: 98.6 F | SYSTOLIC BLOOD PRESSURE: 133 MMHG | HEART RATE: 110 BPM | OXYGEN SATURATION: 97 % | HEIGHT: 74 IN | DIASTOLIC BLOOD PRESSURE: 81 MMHG | RESPIRATION RATE: 18 BRPM

## 2018-02-11 DIAGNOSIS — J02.9 ACUTE PHARYNGITIS, UNSPECIFIED ETIOLOGY: Primary | ICD-10-CM

## 2018-02-11 LAB
GROUP A STREP CULTURE, REFLEX: NEGATIVE
REFLEX THROAT C + S: NORMAL

## 2018-02-11 PROCEDURE — 99213 OFFICE O/P EST LOW 20 MIN: CPT | Performed by: NURSE PRACTITIONER

## 2018-02-11 PROCEDURE — 99203 OFFICE O/P NEW LOW 30 MIN: CPT

## 2018-02-11 PROCEDURE — 87070 CULTURE OTHR SPECIMN AEROBIC: CPT

## 2018-02-11 PROCEDURE — 87880 STREP A ASSAY W/OPTIC: CPT

## 2018-02-11 RX ORDER — AMOXICILLIN 500 MG/1
500 CAPSULE ORAL 2 TIMES DAILY
Qty: 20 CAPSULE | Refills: 0 | Status: SHIPPED | OUTPATIENT
Start: 2018-02-11 | End: 2018-02-21

## 2018-02-11 ASSESSMENT — PAIN DESCRIPTION - LOCATION: LOCATION: THROAT

## 2018-02-11 ASSESSMENT — PAIN SCALES - GENERAL: PAINLEVEL_OUTOF10: 5

## 2018-02-11 ASSESSMENT — ENCOUNTER SYMPTOMS
COUGH: 0
SORE THROAT: 1

## 2018-02-11 ASSESSMENT — PAIN DESCRIPTION - PAIN TYPE: TYPE: ACUTE PAIN

## 2018-02-11 NOTE — ED TRIAGE NOTES
Patient ambulated to room with complaint of sore throat that started on Friday. Patient states it is very painful to swallow and feels like right side is more painful.

## 2018-02-11 NOTE — ED PROVIDER NOTES
never used smokeless tobacco. He reports that he does not drink alcohol or use drugs. PHYSICAL EXAM     ED TRIAGE VITALS  BP: 133/81, Temp: 98.6 °F (37 °C), Pulse: 110, Resp: 18, SpO2: 97 %  Physical Exam   Constitutional: He is oriented to person, place, and time. He appears well-developed and well-nourished. No distress. HENT:   Head: Normocephalic and atraumatic. Right Ear: External ear normal.   Left Ear: External ear normal.   Tonsils enlarged right greater than left erythema with mild exudate on right tonsil. Eyes: Right eye exhibits no discharge. Left eye exhibits no discharge. Neck: Normal range of motion. Neck supple. Cardiovascular: Normal rate and normal heart sounds. Pulmonary/Chest: Effort normal and breath sounds normal.   Musculoskeletal: Normal range of motion. Lymphadenopathy:     He has cervical adenopathy. Neurological: He is oriented to person, place, and time. Skin: Skin is dry. No rash noted. Psychiatric: He has a normal mood and affect. His behavior is normal. Judgment normal.   Nursing note and vitals reviewed. DIAGNOSTIC RESULTS   Labs:  Results for orders placed or performed during the hospital encounter of 02/11/18   Group A Strep-Reflex   Result Value Ref Range    GROUP A STREP CULTURE, REFLEX NEGATIVE     REFLEX THROAT C + S INDICATED        IMAGING:  No orders to display     URGENT CARE COURSE:     Vitals:    02/11/18 1728   BP: 133/81   Pulse: 110   Resp: 18   Temp: 98.6 °F (37 °C)   TempSrc: Oral   SpO2: 97%   Weight: 232 lb 3.2 oz (105.3 kg)   Height: 6' 2\" (1.88 m)       Medications - No data to display  PROCEDURES:  None  FINAL IMPRESSION      1.  Acute pharyngitis, unspecified etiology        DISPOSITION/PLAN   DISPOSITION      PATIENT REFERRED TO:  Delores Carvajal Dr.  [address] [facility]  [phone]    In 1 week  If symptoms worsen    DISCHARGE MEDICATIONS:  Discharge Medication List as of 2/11/2018  6:26 PM      START taking these

## 2018-02-11 NOTE — LETTER
[address] [place] [address] Urgent Care  [address] [place], [place] [address] [place] [address].[facility] [address]  Phone: [phone]               February 11, 2018    Patient: Patricia Nicolas   YOB: 1995   Date of Visit: 2/11/2018       To Whom It May Concern: Milena Mazariegos was seen and treated in our emergency department on 2/11/2018. He may return to school on 2/13/2018.       Sincerely,       Holly Okeefe CNP         Signature:__________________________________

## 2018-02-13 LAB — THROAT/NOSE CULTURE: NORMAL

## 2018-02-22 ENCOUNTER — OFFICE VISIT (OUTPATIENT)
Dept: BEHAVIORAL/MENTAL HEALTH CLINIC | Age: 23
End: 2018-02-22
Payer: COMMERCIAL

## 2018-02-22 DIAGNOSIS — F33.41 RECURRENT MAJOR DEPRESSIVE DISORDER, IN PARTIAL REMISSION (HCC): Primary | ICD-10-CM

## 2018-02-22 PROCEDURE — 90832 PSYTX W PT 30 MINUTES: CPT | Performed by: PSYCHOLOGIST

## 2018-02-22 ASSESSMENT — PATIENT HEALTH QUESTIONNAIRE - PHQ9
SUM OF ALL RESPONSES TO PHQ QUESTIONS 1-9: 3
3. TROUBLE FALLING OR STAYING ASLEEP: 0
5. POOR APPETITE OR OVEREATING: 0
2. FEELING DOWN, DEPRESSED OR HOPELESS: 0
6. FEELING BAD ABOUT YOURSELF - OR THAT YOU ARE A FAILURE OR HAVE LET YOURSELF OR YOUR FAMILY DOWN: 0
1. LITTLE INTEREST OR PLEASURE IN DOING THINGS: 1
10. IF YOU CHECKED OFF ANY PROBLEMS, HOW DIFFICULT HAVE THESE PROBLEMS MADE IT FOR YOU TO DO YOUR WORK, TAKE CARE OF THINGS AT HOME, OR GET ALONG WITH OTHER PEOPLE: 0
4. FEELING TIRED OR HAVING LITTLE ENERGY: 1
7. TROUBLE CONCENTRATING ON THINGS, SUCH AS READING THE NEWSPAPER OR WATCHING TELEVISION: 1
9. THOUGHTS THAT YOU WOULD BE BETTER OFF DEAD, OR OF HURTING YOURSELF: 0
8. MOVING OR SPEAKING SO SLOWLY THAT OTHER PEOPLE COULD HAVE NOTICED. OR THE OPPOSITE, BEING SO FIGETY OR RESTLESS THAT YOU HAVE BEEN MOVING AROUND A LOT MORE THAN USUAL: 0
SUM OF ALL RESPONSES TO PHQ9 QUESTIONS 1 & 2: 1

## 2018-03-14 ENCOUNTER — OFFICE VISIT (OUTPATIENT)
Dept: BEHAVIORAL/MENTAL HEALTH CLINIC | Age: 23
End: 2018-03-14
Payer: COMMERCIAL

## 2018-03-14 DIAGNOSIS — F33.41 RECURRENT MAJOR DEPRESSIVE DISORDER, IN PARTIAL REMISSION (HCC): Primary | ICD-10-CM

## 2018-03-14 DIAGNOSIS — F90.9 ADULT ADHD: ICD-10-CM

## 2018-03-14 PROCEDURE — 90832 PSYTX W PT 30 MINUTES: CPT | Performed by: PSYCHOLOGIST

## 2018-03-14 ASSESSMENT — PATIENT HEALTH QUESTIONNAIRE - PHQ9
8. MOVING OR SPEAKING SO SLOWLY THAT OTHER PEOPLE COULD HAVE NOTICED. OR THE OPPOSITE, BEING SO FIGETY OR RESTLESS THAT YOU HAVE BEEN MOVING AROUND A LOT MORE THAN USUAL: 0
7. TROUBLE CONCENTRATING ON THINGS, SUCH AS READING THE NEWSPAPER OR WATCHING TELEVISION: 1
2. FEELING DOWN, DEPRESSED OR HOPELESS: 0
9. THOUGHTS THAT YOU WOULD BE BETTER OFF DEAD, OR OF HURTING YOURSELF: 0
6. FEELING BAD ABOUT YOURSELF - OR THAT YOU ARE A FAILURE OR HAVE LET YOURSELF OR YOUR FAMILY DOWN: 0
10. IF YOU CHECKED OFF ANY PROBLEMS, HOW DIFFICULT HAVE THESE PROBLEMS MADE IT FOR YOU TO DO YOUR WORK, TAKE CARE OF THINGS AT HOME, OR GET ALONG WITH OTHER PEOPLE: 0
4. FEELING TIRED OR HAVING LITTLE ENERGY: 1
3. TROUBLE FALLING OR STAYING ASLEEP: 1
5. POOR APPETITE OR OVEREATING: 0
1. LITTLE INTEREST OR PLEASURE IN DOING THINGS: 1
SUM OF ALL RESPONSES TO PHQ QUESTIONS 1-9: 4
SUM OF ALL RESPONSES TO PHQ9 QUESTIONS 1 & 2: 1

## 2018-03-14 NOTE — PATIENT INSTRUCTIONS
1.  Some stress management strategies are attached for your review. 2.  You are doing great! Remember to take care of yourself. 3.  Check into the [employer] adrián    4. No follow up with Dr. Cynthia Ames at this time. You can contact him at any time in the future if needed. STRESS MANAGEMENT STRATEGIES    1. Recognize Stress:  Learning to recognize when your body is reacting to stress and identifying our stressors are the first steps in managing stress. 2. Take a Break:  A change of pace, no matter how short, gives us a new outlook on old problems. Take a vacation 20 minutes a day  enjoy a change from the daily routine. 3. Learn to Relax:  Under stress, the muscles in our bodies stay tight. One of the most effective ways to combat tensions is deep muscle relaxation. Other techniques that produce muscle and mental relaxation are yoga, prayer, and deep breathing. 4. Be Nutritionally Aware:  Good nutrition is vital to optimum health, and is especially critical when we are under unusual stress, or going through a major life change. 5. Exercise Regularly:  Just like nutrition, exercise is imperative for maintaining good fitness. Whatever you enjoy  swimming, walking, jogging, aerobic exercise  will help you let off steam and work out stress. 6. Prioritize Sleep:  Aim to get 8 hours of sleep. Develop a bedtime routine and stick to it. The more well-rested you are, the more energy you will have, and the less irritable you will be. Feeling tired from lack of sleep can also lead to irrational thinking and poor decision-making. 7. Plan your Work:  Tension and anxiety really build up when our work seems endless. Plan your work to use time and energy more efficiently. Take one thing at a time. 8. Talk it Over: This may be the most important thing you can do for yourself if you cant get a handle on things. Find a good listener.   Just as a pressure relief valve allows steam to flow out

## 2018-04-18 ENCOUNTER — OFFICE VISIT (OUTPATIENT)
Dept: FAMILY MEDICINE CLINIC | Age: 23
End: 2018-04-18
Payer: COMMERCIAL

## 2018-04-18 VITALS
BODY MASS INDEX: 31.44 KG/M2 | SYSTOLIC BLOOD PRESSURE: 124 MMHG | RESPIRATION RATE: 12 BRPM | WEIGHT: 245 LBS | DIASTOLIC BLOOD PRESSURE: 68 MMHG | HEART RATE: 64 BPM | TEMPERATURE: 97.8 F | HEIGHT: 74 IN

## 2018-04-18 DIAGNOSIS — F90.2 ADHD (ATTENTION DEFICIT HYPERACTIVITY DISORDER), COMBINED TYPE: Chronic | ICD-10-CM

## 2018-04-18 DIAGNOSIS — F33.42 RECURRENT MAJOR DEPRESSIVE DISORDER, IN FULL REMISSION (HCC): Primary | ICD-10-CM

## 2018-04-18 DIAGNOSIS — F41.9 ANXIETY: Chronic | ICD-10-CM

## 2018-04-18 PROCEDURE — 99213 OFFICE O/P EST LOW 20 MIN: CPT | Performed by: FAMILY MEDICINE

## 2018-04-18 RX ORDER — ESCITALOPRAM OXALATE 10 MG/1
10 TABLET ORAL DAILY
Qty: 90 TABLET | Refills: 1 | Status: SHIPPED | OUTPATIENT
Start: 2018-04-18